# Patient Record
Sex: FEMALE | Race: WHITE
[De-identification: names, ages, dates, MRNs, and addresses within clinical notes are randomized per-mention and may not be internally consistent; named-entity substitution may affect disease eponyms.]

---

## 2021-03-29 ENCOUNTER — HOSPITAL ENCOUNTER (EMERGENCY)
Dept: HOSPITAL 8 - ED | Age: 31
Discharge: HOME | End: 2021-03-29
Payer: COMMERCIAL

## 2021-03-29 VITALS — BODY MASS INDEX: 22.47 KG/M2 | WEIGHT: 131.62 LBS | HEIGHT: 64 IN

## 2021-03-29 VITALS — SYSTOLIC BLOOD PRESSURE: 127 MMHG | DIASTOLIC BLOOD PRESSURE: 83 MMHG

## 2021-03-29 DIAGNOSIS — B34.9: ICD-10-CM

## 2021-03-29 DIAGNOSIS — U07.1: Primary | ICD-10-CM

## 2021-03-29 DIAGNOSIS — J02.8: ICD-10-CM

## 2021-03-29 PROCEDURE — 71045 X-RAY EXAM CHEST 1 VIEW: CPT

## 2021-03-29 PROCEDURE — 96372 THER/PROPH/DIAG INJ SC/IM: CPT

## 2021-03-29 PROCEDURE — 87880 STREP A ASSAY W/OPTIC: CPT

## 2021-03-29 PROCEDURE — 99284 EMERGENCY DEPT VISIT MOD MDM: CPT

## 2021-03-29 PROCEDURE — 87081 CULTURE SCREEN ONLY: CPT

## 2021-03-29 NOTE — NUR
MARAL RN:  THIS IS A 31 YEAR OLD WITH WHO HAS COVID-19 POSITIVE, WHO C/O OF 
THROAT PAIN AND SWELLING, (ABLE TO COMPLETE SENTENCES), PT ALSO C/O OF 
BILATERAL CALF PAIN.